# Patient Record
Sex: FEMALE | Race: WHITE | Employment: OTHER | URBAN - METROPOLITAN AREA
[De-identification: names, ages, dates, MRNs, and addresses within clinical notes are randomized per-mention and may not be internally consistent; named-entity substitution may affect disease eponyms.]

---

## 2012-08-02 LAB — COLONOSCOPY, EXTERNAL: NORMAL

## 2017-09-18 ENCOUNTER — HOSPITAL ENCOUNTER (OUTPATIENT)
Dept: MAMMOGRAPHY | Age: 72
Discharge: HOME | End: 2017-09-18
Attending: FAMILY MEDICINE
Payer: MEDICARE

## 2017-09-18 DIAGNOSIS — Z13.820 SCREENING FOR OSTEOPOROSIS: ICD-10-CM

## 2017-09-18 DIAGNOSIS — M81.0 PERIMENOPAUSAL BONE LOSS: ICD-10-CM

## 2017-09-18 PROCEDURE — 77080 DXA BONE DENSITY AXIAL: CPT

## 2020-09-10 VITALS
SYSTOLIC BLOOD PRESSURE: 120 MMHG | OXYGEN SATURATION: 98 % | BODY MASS INDEX: 26.5 KG/M2 | TEMPERATURE: 98 F | HEART RATE: 82 BPM | DIASTOLIC BLOOD PRESSURE: 80 MMHG | HEIGHT: 62 IN | WEIGHT: 144 LBS

## 2020-09-10 PROBLEM — M81.0 OSTEOPOROSIS: Status: ACTIVE | Noted: 2020-09-10

## 2020-09-10 PROBLEM — E78.2 MIXED HYPERLIPIDEMIA: Status: ACTIVE | Noted: 2020-09-10

## 2020-09-10 PROBLEM — K21.9 GERD (GASTROESOPHAGEAL REFLUX DISEASE): Status: ACTIVE | Noted: 2020-09-10

## 2020-09-10 PROBLEM — L65.8 FEMALE PATTERN ALOPECIA: Status: ACTIVE | Noted: 2020-09-10

## 2020-09-10 PROBLEM — I48.91 ATRIAL FIBRILLATION (HCC): Status: ACTIVE | Noted: 2020-09-10

## 2020-09-10 PROBLEM — E66.3 OVERWEIGHT: Status: ACTIVE | Noted: 2020-09-10

## 2020-09-10 PROBLEM — I49.9 CARDIAC ARRHYTHMIA: Status: ACTIVE | Noted: 2020-09-10

## 2020-09-10 PROBLEM — N18.30 KIDNEY DISEASE, CHRONIC, STAGE III (GFR 30-59 ML/MIN) (HCC): Status: ACTIVE | Noted: 2020-09-10

## 2020-09-10 PROBLEM — M19.90 OSTEOARTHRITIS: Status: ACTIVE | Noted: 2020-09-10

## 2020-09-10 PROBLEM — R01.1 HEART MURMUR: Status: ACTIVE | Noted: 2020-09-10

## 2020-09-10 PROBLEM — Z85.3 HISTORY OF MALIGNANT NEOPLASM OF BREAST: Status: ACTIVE | Noted: 2020-09-10

## 2020-09-10 RX ORDER — ATORVASTATIN CALCIUM 10 MG/1
TABLET, FILM COATED ORAL
COMMUNITY
Start: 2017-05-13

## 2020-09-10 RX ORDER — PANTOPRAZOLE SODIUM 40 MG/1
TABLET, DELAYED RELEASE ORAL
COMMUNITY
Start: 2017-05-22 | End: 2021-07-26 | Stop reason: ALTCHOICE

## 2020-09-10 RX ORDER — TRAZODONE HYDROCHLORIDE 50 MG/1
TABLET ORAL
COMMUNITY
Start: 2020-08-13 | End: 2020-11-15

## 2020-09-10 RX ORDER — SPIRONOLACTONE 50 MG/1
TABLET, FILM COATED ORAL
COMMUNITY
Start: 2017-05-31

## 2020-09-10 RX ORDER — METOPROLOL SUCCINATE 25 MG/1
25 TABLET, EXTENDED RELEASE ORAL
COMMUNITY
Start: 2020-07-08

## 2020-09-10 RX ORDER — APIXABAN 5 MG/1
TABLET, FILM COATED ORAL
COMMUNITY
Start: 2020-07-07

## 2020-09-10 RX ORDER — CEPHALEXIN 250 MG/1
CAPSULE ORAL
COMMUNITY
Start: 2020-06-14 | End: 2020-12-29 | Stop reason: SDUPTHER

## 2020-09-14 ENCOUNTER — OFFICE VISIT (OUTPATIENT)
Dept: FAMILY MEDICINE CLINIC | Age: 75
End: 2020-09-14
Payer: MEDICARE

## 2020-09-14 VITALS
WEIGHT: 143.25 LBS | HEIGHT: 63 IN | TEMPERATURE: 97.5 F | BODY MASS INDEX: 25.38 KG/M2 | HEART RATE: 70 BPM | SYSTOLIC BLOOD PRESSURE: 100 MMHG | DIASTOLIC BLOOD PRESSURE: 68 MMHG

## 2020-09-14 DIAGNOSIS — E78.2 MIXED HYPERLIPIDEMIA: ICD-10-CM

## 2020-09-14 DIAGNOSIS — N18.30 KIDNEY DISEASE, CHRONIC, STAGE III (GFR 30-59 ML/MIN) (HCC): Primary | ICD-10-CM

## 2020-09-14 DIAGNOSIS — E66.3 OVERWEIGHT: ICD-10-CM

## 2020-09-14 PROCEDURE — G8419 CALC BMI OUT NRM PARAM NOF/U: HCPCS | Performed by: NURSE PRACTITIONER

## 2020-09-14 PROCEDURE — G8427 DOCREV CUR MEDS BY ELIG CLIN: HCPCS | Performed by: NURSE PRACTITIONER

## 2020-09-14 PROCEDURE — 1101F PT FALLS ASSESS-DOCD LE1/YR: CPT | Performed by: NURSE PRACTITIONER

## 2020-09-14 PROCEDURE — 3017F COLORECTAL CA SCREEN DOC REV: CPT | Performed by: NURSE PRACTITIONER

## 2020-09-14 PROCEDURE — G8432 DEP SCR NOT DOC, RNG: HCPCS | Performed by: NURSE PRACTITIONER

## 2020-09-14 PROCEDURE — G0439 PPPS, SUBSEQ VISIT: HCPCS | Performed by: NURSE PRACTITIONER

## 2020-09-14 PROCEDURE — G8536 NO DOC ELDER MAL SCRN: HCPCS | Performed by: NURSE PRACTITIONER

## 2020-09-14 RX ORDER — ERGOCALCIFEROL 1.25 MG/1
50000 CAPSULE ORAL
Qty: 12 CAP | Refills: 1 | Status: SHIPPED | OUTPATIENT
Start: 2020-09-14 | End: 2021-07-26 | Stop reason: ALTCHOICE

## 2020-09-14 NOTE — PROGRESS NOTES
Medicare Wellness Exam:    Chief Complaint   Patient presents with    Annual Wellness Visit     she is a 76y.o. year old female who presents for evaluation for their Medicare Wellness Visit. She has aged out of Pap screenings and had a mammogram in 2019 that was WNL. She has a history of breast malignancy. She has had both of the shingrix injections and here last tdap was 2017. She had a bone density about 5 years ago and does have a diagnois of osteoporosis and a colonoscopy about 6 or 7 years ago that was WNL. She is followed by Dermatolgy (Dr. Carlene Anderson, urology, nephrology and cardiology. She has chronic UTI and takes antibiotics prophylactically. She got her flu shot at the drug store recently. She is adherent with the medications that treat her conditiions and she is adherent with her medications. She spends part of the year in [place] where her children live and will eventually move down there. She also has a cardiologist in [place]. She is leaving this week as a matter of fact and will be getting her mammogram there. She does not take anything for her osteoporosis and we discussed vitamin D supplementation which she has agreed to. Her appetite is good but she has an irregular sleep schedule since she no longer works and takes trazadone to aid in her sleep. Patient is otherwise without complaints    Fall Screen is completed and assessed=yes  Depression Screen is completed and assessed=yes  Medication list reviewed and adjusted for accuracy=yes  Immunizations reviewed and updated=yes  Health/Preventative Screenings reviewed and updated=yes  ADL Functions reviewed=yes  See scanned medicare wellness documents for full details.      Patient Active Problem List    Diagnosis    Atrial fibrillation [facility])    Cardiac arrhythmia    Female pattern alopecia    GERD (gastroesophageal reflux disease)    Heart murmur    History of malignant neoplasm of breast    Kidney disease, chronic, stage III (GFR 30-59 ml/min) ([facility])    Mixed hyperlipidemia    Osteoarthritis    Osteoporosis    Overweight       Reviewed PmHx, RxHx, FmHx, SocHx, AllgHx and updated and dated in the chart. ROS   ROS per active problem list and HPI    Objective:     Vitals:    09/14/20 0754   BP: 100/68   Pulse: 70   Temp: 97.5 °F (36.4 °C)   TempSrc: Temporal   Weight: 143 lb 4 oz (65 kg)   Height: 5' 3\" (1.6 m)     Physical Exam  Vitals signs reviewed. HENT:      Head: Normocephalic. Neck:      Musculoskeletal: Normal range of motion. Cardiovascular:      Rate and Rhythm: Normal rate and regular rhythm. Heart sounds: Normal heart sounds. Pulmonary:      Effort: Pulmonary effort is normal.      Breath sounds: Normal breath sounds. Abdominal:      General: Bowel sounds are normal.      Palpations: Abdomen is soft. Musculoskeletal: Normal range of motion. Skin:     General: Skin is warm and dry. Neurological:      Mental Status: She is alert and oriented to person, place, and time. Psychiatric:         Mood and Affect: Mood normal.         Behavior: Behavior normal.         Thought Content: Thought content normal.         Judgment: Judgment normal.          Assessment/ Plan:   Diagnoses and all orders for this visit:    1. Kidney disease, chronic, stage III (GFR 30-59 ml/min) ([facility])  -     METABOLIC PANEL, COMPREHENSIVE    2. Mixed hyperlipidemia  -     CBC WITH AUTOMATED DIFF  -     LIPID PANEL    3. Overweight    Other orders  -     ergocalciferol (ERGOCALCIFEROL) 1,250 mcg (50,000 unit) capsule;  Take 1 Cap by mouth every seven (7) days.         -Pain evaluation performed in office  -Cognitive Screen performed in office  -Depression Screen, Fall risks (by up and go test)  and ADL functionality were addressed  -Medication list updated and reviewed for any changes   -A comprehensive review of medical issues and a plan was formulated  -End of life planning was addressed with pt   -Health Screenings for preventions were addressed and a plan was formulated  -Shingles Vaccine was recommended  -Discussed with patient cancer risk factors and appropriate screenings for age  -Patient evaluated for colonoscopy and referred if needed per screeing criteria  -Labs from previous visits were discussed with patient   -Discussed with patient diet and exercise and formulated a plan as needed  -An Advanced care plan was developed with the patient.  -Alcohol screening performed and was negative    -  Follow-up and Dispositions    · Return in about 6 months (around 3/14/2021) for [address] with annual physical and fasting labs. I have discussed the diagnosis with the patient and the intended plan as seen in the above orders. The patient understands and agrees with the plan. The patient has received an after-visit summary and questions were answered concerning future plans. Medication Side Effects and Warnings were discussed with patien  Patient Labs were reviewed and or requested  Patient Past Records were reviewed and or requested    There are no Patient Instructions on file for this visit.       Princess Lopez NP

## 2020-09-15 LAB
ALBUMIN SERPL-MCNC: 4.6 G/DL (ref 3.7–4.7)
ALBUMIN/GLOB SERPL: 2.4 {RATIO} (ref 1.2–2.2)
ALP SERPL-CCNC: 88 IU/L (ref 39–117)
ALT SERPL-CCNC: 12 IU/L (ref 0–32)
AST SERPL-CCNC: 21 IU/L (ref 0–40)
BASOPHILS # BLD AUTO: 0.1 X10E3/UL (ref 0–0.2)
BASOPHILS NFR BLD AUTO: 1 %
BILIRUB SERPL-MCNC: 0.6 MG/DL (ref 0–1.2)
BUN SERPL-MCNC: 19 MG/DL (ref 8–27)
BUN/CREAT SERPL: 18 (ref 12–28)
CALCIUM SERPL-MCNC: 9.3 MG/DL (ref 8.7–10.3)
CHLORIDE SERPL-SCNC: 106 MMOL/L (ref 96–106)
CHOLEST SERPL-MCNC: 172 MG/DL (ref 100–199)
CO2 SERPL-SCNC: 24 MMOL/L (ref 20–29)
CREAT SERPL-MCNC: 1.03 MG/DL (ref 0.57–1)
EOSINOPHIL # BLD AUTO: 0.3 X10E3/UL (ref 0–0.4)
EOSINOPHIL NFR BLD AUTO: 5 %
ERYTHROCYTE [DISTWIDTH] IN BLOOD BY AUTOMATED COUNT: 12.4 % (ref 11.7–15.4)
GLOBULIN SER CALC-MCNC: 1.9 G/DL (ref 1.5–4.5)
GLUCOSE SERPL-MCNC: 75 MG/DL (ref 65–99)
HCT VFR BLD AUTO: 41.3 % (ref 34–46.6)
HDLC SERPL-MCNC: 77 MG/DL
HGB BLD-MCNC: 13.7 G/DL (ref 11.1–15.9)
IMM GRANULOCYTES # BLD AUTO: 0 X10E3/UL (ref 0–0.1)
IMM GRANULOCYTES NFR BLD AUTO: 0 %
LDLC SERPL CALC-MCNC: 82 MG/DL (ref 0–99)
LYMPHOCYTES # BLD AUTO: 1.8 X10E3/UL (ref 0.7–3.1)
LYMPHOCYTES NFR BLD AUTO: 31 %
MCH RBC QN AUTO: 30.8 PG (ref 26.6–33)
MCHC RBC AUTO-ENTMCNC: 33.2 G/DL (ref 31.5–35.7)
MCV RBC AUTO: 93 FL (ref 79–97)
MONOCYTES # BLD AUTO: 0.6 X10E3/UL (ref 0.1–0.9)
MONOCYTES NFR BLD AUTO: 11 %
NEUTROPHILS # BLD AUTO: 3.1 X10E3/UL (ref 1.4–7)
NEUTROPHILS NFR BLD AUTO: 52 %
PLATELET # BLD AUTO: 242 X10E3/UL (ref 150–450)
POTASSIUM SERPL-SCNC: 4.6 MMOL/L (ref 3.5–5.2)
PROT SERPL-MCNC: 6.5 G/DL (ref 6–8.5)
RBC # BLD AUTO: 4.45 X10E6/UL (ref 3.77–5.28)
SODIUM SERPL-SCNC: 143 MMOL/L (ref 134–144)
TRIGL SERPL-MCNC: 67 MG/DL (ref 0–149)
VLDLC SERPL CALC-MCNC: 13 MG/DL (ref 5–40)
WBC # BLD AUTO: 5.9 X10E3/UL (ref 3.4–10.8)

## 2020-10-05 NOTE — PROGRESS NOTES
There is an abnormality in one of your kidney functions but that was there last year and it is actually improved. Your other labs are basically normal.  Some values may be minimally outside the \"normal\" range but are not harmful or clinically significant. Please contact the office if you have questions or concerns. I would advise you to continue avoiding the use of medications such as aleve, or ibuprofen as they have a tendency to damage kidney function with regular use.   We will continue to monitor for continued improvement

## 2020-10-06 ENCOUNTER — TELEPHONE (OUTPATIENT)
Dept: FAMILY MEDICINE CLINIC | Age: 75
End: 2020-10-06

## 2020-10-06 DIAGNOSIS — Z12.31 VISIT FOR SCREENING MAMMOGRAM: Primary | ICD-10-CM

## 2020-10-06 NOTE — TELEPHONE ENCOUNTER
Patient is staying with daughter in [place] until the first of the year and would like an order for her routine mammogram sent to where her daughters goes in [place] .         [address] [facility] [address]  [address] [place] [address] [facility]   #[phone] Fax # [phone]

## 2020-10-11 ENCOUNTER — PATIENT MESSAGE (OUTPATIENT)
Dept: FAMILY MEDICINE CLINIC | Age: 75
End: 2020-10-11

## 2020-10-11 NOTE — TELEPHONE ENCOUNTER
From: Melania Higgins  To: Farhan Esposito NP  Sent: 10/11/2020 6:30 AM EDT  Subject: Non-Urgent Medical Question    After my annual visit on 9-14, I began taking calcium as you prescribed (don't have the prescription info available right now) once a week. On 9-24, I flew to [address] [place] [address] to spend 6 months (as usual) with my children. Within a week, I began struggling with what was initially a nagging throbbing pain behind my right knee. My [address] [place] [address] cardiologist recommended an ultrasound which was normal. Sometimes I have a problem adjusting to the lack of humidity here, and, in the past, I have developed UTIs which I feel are a result of dehydration. I have been drinking huge amounts of water thinking that could be contributing to my discomfort, now I'm also drinking Gatorade. My leg problem continues - it's not leg cramps which I have suffered when dehydrated before - the muscles in my calves, thighs and hips are just constantly sore - can't seem to get relief from walking, heat or Tylenol. I have been diagnosed with kidney failure, and I could schedule an appointment with my nephrologist - but I'm   wondering if the calcium (only new drug) could be the culprit. Please send your recommendations.

## 2020-10-20 ENCOUNTER — PATIENT MESSAGE (OUTPATIENT)
Dept: FAMILY MEDICINE CLINIC | Age: 75
End: 2020-10-20

## 2020-10-20 DIAGNOSIS — Z12.31 SCREENING MAMMOGRAM FOR HIGH-RISK PATIENT: Primary | ICD-10-CM

## 2020-10-20 NOTE — TELEPHONE ENCOUNTER
From: Morenita Pro  To: Eros Wadsworth NP  Sent: 10/20/2020 3:56 PM EDT  Subject: Referral Request    In response to your question about mammograms, I think I generally have digital mammograms on both breasts - breast cancer in right breast 25 years ago.

## 2020-10-22 NOTE — TELEPHONE ENCOUNTER
Sending bilateral mammogram order to [address] [place] [address],  [address].   [facility] [address], [place]., [address] [phone]

## 2020-12-22 ENCOUNTER — TELEPHONE (OUTPATIENT)
Dept: UROLOGY | Age: 75
End: 2020-12-22

## 2020-12-28 NOTE — TELEPHONE ENCOUNTER
Patient last seen on my review on 12/4/19. She should have a visit (was due at 6 mo cooper). Keflex was prescribed for 90 days at that time.

## 2020-12-28 NOTE — TELEPHONE ENCOUNTER
Pt scheduled an appointment on may 5th 2021 she currently is in [facility]. ... so wants to know if you can refill up to that appointment date to the [facility] [address] [facility]

## 2020-12-29 DIAGNOSIS — N39.0 URINARY TRACT INFECTION WITHOUT HEMATURIA, SITE UNSPECIFIED: Primary | ICD-10-CM

## 2020-12-29 DIAGNOSIS — N39.0 URINARY TRACT INFECTION WITHOUT HEMATURIA, SITE UNSPECIFIED: ICD-10-CM

## 2020-12-29 RX ORDER — CEPHALEXIN 250 MG/1
250 CAPSULE ORAL DAILY
Qty: 30 CAP | Refills: 0 | Status: SHIPPED | OUTPATIENT
Start: 2020-12-29 | End: 2020-12-29

## 2020-12-29 RX ORDER — CEPHALEXIN 250 MG/1
250 CAPSULE ORAL DAILY
Qty: 30 CAP | Refills: 0 | Status: SHIPPED | OUTPATIENT
Start: 2020-12-29 | End: 2021-01-28

## 2021-02-20 ENCOUNTER — PATIENT MESSAGE (OUTPATIENT)
Dept: FAMILY MEDICINE CLINIC | Age: 76
End: 2021-02-20

## 2021-02-25 NOTE — TELEPHONE ENCOUNTER
From: Marc Matute  To: Gt Deluca NP  Sent: 2/20/2021 3:53 PM EST  Subject: Test Results Question    I had a mammogram at [address] [facility] in [address] [place] [address] in January, & they have indicated that those results either have been or will be sent to you for comparison with previous results. They have also indicated that I need follow up testing. When you can compare recent & previous screenings, would you contact me as well as Srinivasa re: any necessary follow up. Their # [phone]. Thank you.

## 2021-03-11 NOTE — PROGRESS NOTES
They are asking for prior study and there are none in [place]. I guess we will have to request her paper chart.   Would you call megan and let them know

## 2021-03-16 NOTE — PROGRESS NOTES
Do you want me to send last report that is [facility]?   Last mammo in [facility] done 12/5/19

## 2021-05-03 ENCOUNTER — TELEPHONE (OUTPATIENT)
Dept: UROLOGY | Age: 76
End: 2021-05-03

## 2021-05-03 NOTE — TELEPHONE ENCOUNTER
Patient canceled appointment because she is moving out of town and found a urologist in the [facility] town that she is moving to.  Just wanted to let you know

## 2021-07-26 ENCOUNTER — OFFICE VISIT (OUTPATIENT)
Dept: FAMILY MEDICINE CLINIC | Age: 76
End: 2021-07-26
Payer: MEDICARE

## 2021-07-26 VITALS
DIASTOLIC BLOOD PRESSURE: 76 MMHG | TEMPERATURE: 97.5 F | SYSTOLIC BLOOD PRESSURE: 120 MMHG | OXYGEN SATURATION: 97 % | WEIGHT: 153 LBS | BODY MASS INDEX: 27.11 KG/M2 | HEIGHT: 63 IN | HEART RATE: 76 BPM

## 2021-07-26 DIAGNOSIS — N39.0 URINARY TRACT INFECTION WITH HEMATURIA, SITE UNSPECIFIED: Primary | ICD-10-CM

## 2021-07-26 DIAGNOSIS — R30.0 DYSURIA: ICD-10-CM

## 2021-07-26 DIAGNOSIS — R31.9 URINARY TRACT INFECTION WITH HEMATURIA, SITE UNSPECIFIED: Primary | ICD-10-CM

## 2021-07-26 LAB
BILIRUB UR QL STRIP: NEGATIVE
GLUCOSE UR-MCNC: NEGATIVE MG/DL
KETONES P FAST UR STRIP-MCNC: NEGATIVE MG/DL
PH UR STRIP: 6 [PH] (ref 4.6–8)
PROT UR QL STRIP: NEGATIVE
SP GR UR STRIP: 1.01 (ref 1–1.03)
UA UROBILINOGEN AMB POC: NORMAL (ref 0.2–1)
URINALYSIS CLARITY POC: CLEAR
URINALYSIS COLOR POC: YELLOW
URINE BLOOD POC: NORMAL
URINE LEUKOCYTES POC: NORMAL
URINE NITRITES POC: NEGATIVE

## 2021-07-26 PROCEDURE — 1090F PRES/ABSN URINE INCON ASSESS: CPT | Performed by: STUDENT IN AN ORGANIZED HEALTH CARE EDUCATION/TRAINING PROGRAM

## 2021-07-26 PROCEDURE — G8419 CALC BMI OUT NRM PARAM NOF/U: HCPCS | Performed by: STUDENT IN AN ORGANIZED HEALTH CARE EDUCATION/TRAINING PROGRAM

## 2021-07-26 PROCEDURE — 99213 OFFICE O/P EST LOW 20 MIN: CPT | Performed by: STUDENT IN AN ORGANIZED HEALTH CARE EDUCATION/TRAINING PROGRAM

## 2021-07-26 PROCEDURE — G8432 DEP SCR NOT DOC, RNG: HCPCS | Performed by: STUDENT IN AN ORGANIZED HEALTH CARE EDUCATION/TRAINING PROGRAM

## 2021-07-26 PROCEDURE — G8427 DOCREV CUR MEDS BY ELIG CLIN: HCPCS | Performed by: STUDENT IN AN ORGANIZED HEALTH CARE EDUCATION/TRAINING PROGRAM

## 2021-07-26 PROCEDURE — 1101F PT FALLS ASSESS-DOCD LE1/YR: CPT | Performed by: STUDENT IN AN ORGANIZED HEALTH CARE EDUCATION/TRAINING PROGRAM

## 2021-07-26 PROCEDURE — 81003 URINALYSIS AUTO W/O SCOPE: CPT | Performed by: STUDENT IN AN ORGANIZED HEALTH CARE EDUCATION/TRAINING PROGRAM

## 2021-07-26 PROCEDURE — G8536 NO DOC ELDER MAL SCRN: HCPCS | Performed by: STUDENT IN AN ORGANIZED HEALTH CARE EDUCATION/TRAINING PROGRAM

## 2021-07-26 RX ORDER — FUROSEMIDE 20 MG/1
20 TABLET ORAL DAILY
COMMUNITY
Start: 2021-04-01

## 2021-07-26 RX ORDER — CETIRIZINE HCL 10 MG
10 TABLET ORAL
COMMUNITY

## 2021-07-26 RX ORDER — ACETAMINOPHEN 325 MG/1
650 TABLET ORAL
COMMUNITY

## 2021-07-26 RX ORDER — MINERAL OIL
180 ENEMA (ML) RECTAL EVERY MORNING
COMMUNITY

## 2021-07-26 RX ORDER — AMOXICILLIN 500 MG/1
TABLET, FILM COATED ORAL
COMMUNITY
Start: 2021-07-08 | End: 2021-09-13 | Stop reason: ALTCHOICE

## 2021-07-26 RX ORDER — CEFDINIR 300 MG/1
300 CAPSULE ORAL 2 TIMES DAILY
Qty: 10 CAPSULE | Refills: 0 | Status: SHIPPED | OUTPATIENT
Start: 2021-07-26 | End: 2021-07-31

## 2021-07-26 RX ORDER — D-MANNOSE
500 POWDER (GRAM) ORAL DAILY
COMMUNITY

## 2021-07-26 NOTE — PROGRESS NOTES
Subjective:     Chief Complaint   Patient presents with    UTI     HPI:  Reina Pena is a 68 y.o. female presents for dysuria. Symptoms started today. Patient has history of multiple UTIs. Todays symptoms feels similar to previous UTIs. She complains of dysuria and pressure. She was previously on low-dose daily Keflex and did not have a UTI for about 5 years. Prior to that she was on daily Cipro. Recently she was advised to stop Keflex and start mannose. She was following with Dr. Chanel Nava in the area, but is moving to [place] and is trying to find a new urologist in that area. Denies any other symptoms.     Past Medical History:   Diagnosis Date    Atrial fibrillation ([facility] 9/10/2020    CAD (coronary artery disease) 08/2018    Mitral valve repair    Cancer [facility]) Breast    2005    Cardiac arrhythmia 9/10/2020    Chronic kidney disease Current    Congestive heart failure (HCC) Current    Female pattern alopecia 9/10/2020    GERD (gastroesophageal reflux disease) 9/10/2020    Heart murmur 9/10/2020    History of malignant neoplasm of breast 9/10/2020    Kidney disease, chronic, stage III (GFR 30-59 ml/min) ([facility] [address] [facility] 9/10/2020    Mixed hyperlipidemia 9/10/2020    Osteoarthritis 9/10/2020    Osteoporosis 9/10/2020    Overweight 9/10/2020     Family History   Problem Relation Age of Onset    Hypertension Mother     Stroke Father     Prostate Cancer Father     Cancer Father         Skin Cancer      Social History     Socioeconomic History    Marital status:      Spouse name: Not on file    Number of children: Not on file    Years of education: Not on file    Highest education level: Not on file   Occupational History    Not on file   Tobacco Use    Smoking status: Former Smoker     Packs/day: 1.00     Years: 12.00     Pack years: 12.00    Smokeless tobacco: Never Used   Substance and Sexual Activity    Alcohol use: Not Currently     Comment: Glass of Wine QOD    Drug use: Never    Sexual activity: Not Currently     Partners: Male     Birth control/protection: Pill   Other Topics Concern    Not on file   Social History Narrative    Not on file     Social Determinants of Health     Financial Resource Strain:     Difficulty of Paying Living Expenses:    Food Insecurity:     Worried About Running Out of Food in the Last Year:     [address] in the Last Year:    Transportation Needs:     Lack of Transportation (Medical):  Lack of Transportation (Non-Medical):    Physical Activity:     Days of Exercise per Week:     Minutes of Exercise per Session:    Stress:     Feeling of Stress :    Social Connections:     Frequency of Communication with Friends and Family:     Frequency of Social Gatherings with Friends and Family:     Attends [employer] Services:     Active Member of Clubs or Organizations:     Attends Club or Organization Meetings:     Marital Status:    Intimate Partner Violence:     Fear of Current or Ex-Partner:     Emotionally Abused:     Physically Abused:     Sexually Abused:      Current Outpatient Medications on File Prior to Visit   Medication Sig Dispense Refill    furosemide (LASIX) 20 mg tablet Take 20 mg by mouth daily.  fexofenadine (ALLEGRA) 180 mg tablet Take 180 mg by mouth Every morning.  cetirizine (ZYRTEC) 10 mg tablet Take 10 mg by mouth.  amoxicillin 500 mg tab TAKE FOUR TS PO 1 HOUR B DAPP      d-mannose powd Take 500 mg by mouth daily.  multivitamin (MULTI-DELYN, WELLESSE) liqd Take 15 mL by mouth daily.  acetaminophen (TYLENOL) 325 mg tablet Take 650 mg by mouth every four (4) hours as needed.       traZODone (DESYREL) 50 mg tablet TAKE 1/2 TO 1 TABLET BY MOUTH EVERY NIGHT AT BEDTIME 1 TO 2 HOURS BEFORE DESIRED SLEEP 90 Tab 3    Eliquis 5 mg tablet TK 1 T PO BID      atorvastatin (LIPITOR) 10 mg tablet TK 1 T PO QD HS      metoprolol succinate (TOPROL-XL) 25 mg XL tablet 25 mg.      spironolactone (ALDACTONE) 50 mg tablet TK 1 T PO D      [DISCONTINUED] ergocalciferol (ERGOCALCIFEROL) 1,250 mcg (50,000 unit) capsule Take 1 Cap by mouth every seven (7) days. 12 Cap 1    [DISCONTINUED] pantoprazole (PROTONIX) 40 mg tablet TK 1 T PO D PRF REFLUX       No current facility-administered medications on file prior to visit. Allergies   Allergen Reactions    Latex Hives    Pollen Extracts Other (comments)     Rhinitis     Review of Systems   Genitourinary: Positive for dysuria. Negative for urgency. Objective:     Vitals:    07/26/21 1338   BP: 120/76   Pulse: 76   Temp: 97.5 °F (36.4 °C)   TempSrc: Temporal   SpO2: 97%   Weight: 153 lb (69.4 kg)   Height: 5' 3\" (1.6 m)     Physical Exam  Vitals reviewed. Constitutional:       Appearance: Normal appearance. HENT:      Head: Normocephalic and atraumatic. Cardiovascular:      Rate and Rhythm: Normal rate. Pulmonary:      Effort: Pulmonary effort is normal.   Neurological:      Mental Status: She is alert and oriented to person, place, and time. Psychiatric:         Behavior: Behavior normal.            Assessment/Plan:       1. Urinary tract infection with hematuria, site unspecified        -      Patient with typical UTI symptoms today, and positive UA. She has history of multiple UTIs, and was previously on daily low-dose prophylactic Keflex. Omnicef ordered. Will follow urine culture. We discussed restarting daily low-dose prophylactic Keflex after urine cultures are received. -     cefdinir (OMNICEF) 300 mg capsule; Take 1 Capsule by mouth two (2) times a day for 5 days.  -     URINALYSIS W/ RFLX MICROSCOPIC  -     CULTURE, URINE    2. Dysuria  -     AMB POC URINALYSIS DIP STICK AUTO W/O MICRO    -We will follow-up urine culture. May need to start back on low-dose daily Keflex. Follow-up and Dispositions    · Return if symptoms worsen or fail to improve.             Rylie Marie MD

## 2021-07-26 NOTE — PROGRESS NOTES
Chief Complaint   Patient presents with    UTI     1. Have you been to the ER, urgent care clinic since your last visit? Hospitalized since your last visit? No    2. Have you seen or consulted any other health care providers outside of the [address] [place] [address] since your last visit? Include any pap smears or colon screening.  No

## 2021-07-29 LAB
APPEARANCE UR: CLEAR
BACTERIA #/AREA URNS HPF: NORMAL /[HPF]
BACTERIA UR CULT: ABNORMAL
BILIRUB UR QL STRIP: NEGATIVE
CASTS URNS QL MICRO: NORMAL /LPF
COLOR UR: YELLOW
EPI CELLS #/AREA URNS HPF: NORMAL /HPF (ref 0–10)
GLUCOSE UR QL: NEGATIVE
HGB UR QL STRIP: NEGATIVE
KETONES UR QL STRIP: NEGATIVE
LEUKOCYTE ESTERASE UR QL STRIP: ABNORMAL
MICRO URNS: ABNORMAL
NITRITE UR QL STRIP: NEGATIVE
PH UR STRIP: 6.5 [PH] (ref 5–7.5)
PROT UR QL STRIP: NEGATIVE
RBC #/AREA URNS HPF: NORMAL /HPF (ref 0–2)
SP GR UR: 1.01 (ref 1–1.03)
UROBILINOGEN UR STRIP-MCNC: 0.2 MG/DL (ref 0.2–1)
WBC #/AREA URNS HPF: NORMAL /HPF (ref 0–5)

## 2021-08-06 ENCOUNTER — TELEPHONE (OUTPATIENT)
Dept: FAMILY MEDICINE CLINIC | Age: 76
End: 2021-08-06

## 2021-08-06 NOTE — TELEPHONE ENCOUNTER
Pt thinks her uti never cleared up and would like a refill on her abx she did schedule appointment for Monday 8/9/21just in case.  Please advise

## 2021-08-07 DIAGNOSIS — R31.9 URINARY TRACT INFECTION WITH HEMATURIA, SITE UNSPECIFIED: Primary | ICD-10-CM

## 2021-08-07 DIAGNOSIS — N39.0 URINARY TRACT INFECTION WITH HEMATURIA, SITE UNSPECIFIED: Primary | ICD-10-CM

## 2021-08-07 RX ORDER — CIPROFLOXACIN 500 MG/1
500 TABLET ORAL 2 TIMES DAILY
Qty: 10 TABLET | Refills: 0 | Status: SHIPPED | OUTPATIENT
Start: 2021-08-07 | End: 2021-08-12

## 2021-08-07 NOTE — TELEPHONE ENCOUNTER
Cipro ordered and she continues have UTI symptoms. Urine culture UTI which is susceptible to Cipro. She has taken Cipro in the past without side effects. Message sent to her via [address]

## 2021-08-09 ENCOUNTER — OFFICE VISIT (OUTPATIENT)
Dept: FAMILY MEDICINE CLINIC | Age: 76
End: 2021-08-09
Payer: MEDICARE

## 2021-08-09 VITALS
WEIGHT: 153 LBS | BODY MASS INDEX: 27.11 KG/M2 | OXYGEN SATURATION: 97 % | HEIGHT: 63 IN | DIASTOLIC BLOOD PRESSURE: 74 MMHG | SYSTOLIC BLOOD PRESSURE: 118 MMHG | HEART RATE: 67 BPM | TEMPERATURE: 97.6 F

## 2021-08-09 DIAGNOSIS — R31.9 URINARY TRACT INFECTION WITH HEMATURIA, SITE UNSPECIFIED: ICD-10-CM

## 2021-08-09 DIAGNOSIS — N39.0 URINARY TRACT INFECTION WITH HEMATURIA, SITE UNSPECIFIED: ICD-10-CM

## 2021-08-09 DIAGNOSIS — N39.0 RECURRENT UTI: Primary | ICD-10-CM

## 2021-08-09 PROCEDURE — G8432 DEP SCR NOT DOC, RNG: HCPCS | Performed by: STUDENT IN AN ORGANIZED HEALTH CARE EDUCATION/TRAINING PROGRAM

## 2021-08-09 PROCEDURE — 1090F PRES/ABSN URINE INCON ASSESS: CPT | Performed by: STUDENT IN AN ORGANIZED HEALTH CARE EDUCATION/TRAINING PROGRAM

## 2021-08-09 PROCEDURE — 99213 OFFICE O/P EST LOW 20 MIN: CPT | Performed by: STUDENT IN AN ORGANIZED HEALTH CARE EDUCATION/TRAINING PROGRAM

## 2021-08-09 PROCEDURE — G8427 DOCREV CUR MEDS BY ELIG CLIN: HCPCS | Performed by: STUDENT IN AN ORGANIZED HEALTH CARE EDUCATION/TRAINING PROGRAM

## 2021-08-09 PROCEDURE — 1101F PT FALLS ASSESS-DOCD LE1/YR: CPT | Performed by: STUDENT IN AN ORGANIZED HEALTH CARE EDUCATION/TRAINING PROGRAM

## 2021-08-09 PROCEDURE — G8419 CALC BMI OUT NRM PARAM NOF/U: HCPCS | Performed by: STUDENT IN AN ORGANIZED HEALTH CARE EDUCATION/TRAINING PROGRAM

## 2021-08-09 PROCEDURE — G8536 NO DOC ELDER MAL SCRN: HCPCS | Performed by: STUDENT IN AN ORGANIZED HEALTH CARE EDUCATION/TRAINING PROGRAM

## 2021-08-09 NOTE — PROGRESS NOTES
Subjective:     Chief Complaint   Patient presents with    Follow-up     UTI     HPI:  Eric Celestin is a 68 y.o. female who is here for follow-up of UTI. She recently had an appointment, and was placed on Omnicef due to UTI. About 2 to 3 days after finishing Omnicef she began having UTI symptoms again. Cipro was ordered over the weekend which she started taking yesterday. Symptoms have resolved for now. She does have history of multiple UTIs and had previously been placed on prophylactic low-dose Keflex and prior to that low-dose prophylactic Cipro. She would like to be restarted on his antibiotics.     Past Medical History:   Diagnosis Date    Atrial fibrillation ([facility] [address] 9/10/2020    CAD (coronary artery disease) 08/2018    Mitral valve repair    Cancer [facility]) Breast    2005    Cardiac arrhythmia 9/10/2020    Chronic kidney disease Current    Congestive heart failure (HCC) Current    Female pattern alopecia 9/10/2020    GERD (gastroesophageal reflux disease) 9/10/2020    Heart murmur 9/10/2020    History of malignant neoplasm of breast 9/10/2020    Kidney disease, chronic, stage III (GFR 30-59 ml/min) ([address] 9/10/2020    Mixed hyperlipidemia 9/10/2020    Osteoarthritis 9/10/2020    Osteoporosis 9/10/2020    Overweight 9/10/2020     Family History   Problem Relation Age of Onset    Hypertension Mother     Stroke Father     Prostate Cancer Father     Cancer Father         Skin Cancer      Social History     Socioeconomic History    Marital status:      Spouse name: Not on file    Number of children: Not on file    Years of education: Not on file    Highest education level: Not on file   Occupational History    Not on file   Tobacco Use    Smoking status: Former Smoker     Packs/day: 1.00     Years: 12.00     Pack years: 12.00    Smokeless tobacco: Never Used   Substance and Sexual Activity    Alcohol use: Not Currently     Comment: Glass of Wine QOD    Drug use: Never    Sexual activity: Not Currently     Partners: Male     Birth control/protection: Pill   Other Topics Concern    Not on file   Social History Narrative    Not on file     Social Determinants of Health     Financial Resource Strain:     Difficulty of Paying Living Expenses:    Food Insecurity:     Worried About Running Out of Food in the Last Year:     [address] in the Last Year:    Transportation Needs:     Lack of Transportation (Medical):  Lack of Transportation (Non-Medical):    Physical Activity:     Days of Exercise per Week:     Minutes of Exercise per Session:    Stress:     Feeling of Stress :    Social Connections:     Frequency of Communication with Friends and Family:     Frequency of Social Gatherings with Friends and Family:     Attends Voodoo Services:     Active Member of Clubs or Organizations:     Attends Club or Organization Meetings:     Marital Status:    Intimate Partner Violence:     Fear of Current or Ex-Partner:     Emotionally Abused:     Physically Abused:     Sexually Abused:      Current Outpatient Medications on File Prior to Visit   Medication Sig Dispense Refill    ciprofloxacin HCl (CIPRO) 500 mg tablet Take 1 Tablet by mouth two (2) times a day for 5 days. 10 Tablet 0    furosemide (LASIX) 20 mg tablet Take 20 mg by mouth daily.  fexofenadine (ALLEGRA) 180 mg tablet Take 180 mg by mouth Every morning.  cetirizine (ZYRTEC) 10 mg tablet Take 10 mg by mouth.  amoxicillin 500 mg tab TAKE FOUR TS PO 1 HOUR B DAPP      d-mannose powd Take 500 mg by mouth daily.  multivitamin (MULTI-DELYN, WELLESSE) liqd Take 15 mL by mouth daily.  acetaminophen (TYLENOL) 325 mg tablet Take 650 mg by mouth every four (4) hours as needed.       traZODone (DESYREL) 50 mg tablet TAKE 1/2 TO 1 TABLET BY MOUTH EVERY NIGHT AT BEDTIME 1 TO 2 HOURS BEFORE DESIRED SLEEP 90 Tab 3    Eliquis 5 mg tablet TK 1 T PO BID      atorvastatin (LIPITOR) 10 mg tablet TK 1 T PO QD HS      metoprolol succinate (TOPROL-XL) 25 mg XL tablet 25 mg.      spironolactone (ALDACTONE) 50 mg tablet TK 1 T PO D       No current facility-administered medications on file prior to visit. Allergies   Allergen Reactions    Latex Hives    Pollen Extracts Other (comments)     Rhinitis     Review of Systems   All other systems reviewed and are negative. Objective:     Vitals:    08/09/21 1610   BP: 118/74   Pulse: 67   Temp: 97.6 °F (36.4 °C)   TempSrc: Temporal   SpO2: 97%   Weight: 153 lb (69.4 kg)   Height: 5' 3\" (1.6 m)     Physical Exam  Vitals reviewed. Constitutional:       Appearance: Normal appearance. HENT:      Head: Normocephalic and atraumatic. Cardiovascular:      Rate and Rhythm: Normal rate. Pulmonary:      Effort: Pulmonary effort is normal.   Neurological:      Mental Status: She is alert and oriented to person, place, and time. Psychiatric:         Behavior: Behavior normal.            Assessment/Plan:       1. Recurrent UTI  -     URINALYSIS W/ RFLX MICROSCOPIC  -     CULTURE, URINE    2. Urinary tract infection with hematuria, site unspecified    Plan: Advised to finish Cipro. We will follow-up urine culture and UA. Will wait for urine culture to come back, but will likely restart her on prophylactic low-dose Keflex. Follow-up and Dispositions    · Return if symptoms worsen or fail to improve.           Kevin Nuñez MD

## 2021-08-09 NOTE — PROGRESS NOTES
Chief Complaint   Patient presents with    Follow-up     UTI     1. Have you been to the ER, urgent care clinic since your last visit? Hospitalized since your last visit? No    2. Have you seen or consulted any other health care providers outside of the [address] [place] [address] since your last visit? Include any pap smears or colon screening.  No

## 2021-08-11 LAB
APPEARANCE UR: CLEAR
BACTERIA UR CULT: NO GROWTH
BILIRUB UR QL STRIP: NEGATIVE
COLOR UR: YELLOW
GLUCOSE UR QL: NEGATIVE
HGB UR QL STRIP: NEGATIVE
KETONES UR QL STRIP: NEGATIVE
LEUKOCYTE ESTERASE UR QL STRIP: NEGATIVE
MICRO URNS: NORMAL
NITRITE UR QL STRIP: NEGATIVE
PH UR STRIP: 6.5 [PH] (ref 5–7.5)
PROT UR QL STRIP: NEGATIVE
SP GR UR: 1.01 (ref 1–1.03)
UROBILINOGEN UR STRIP-MCNC: 0.2 MG/DL (ref 0.2–1)

## 2021-08-13 DIAGNOSIS — N39.0 RECURRENT UTI: Primary | ICD-10-CM

## 2021-08-13 RX ORDER — CEPHALEXIN 250 MG/1
250 CAPSULE ORAL DAILY
Qty: 90 CAPSULE | Refills: 0 | Status: SHIPPED | OUTPATIENT
Start: 2021-08-13 | End: 2022-01-18

## 2021-08-13 NOTE — PROGRESS NOTES
Results sent via [email or web address]:Urine culture and urine analysis is negative. The low-dose daily Keflex will be ordered. If you continue to have symptoms despite being antibiotic being started he will need to see urology. Since we are starting daily low-dose Keflex I would like you to make an appointment with us about 2 weeks after starting antibiotic.

## 2021-09-13 ENCOUNTER — OFFICE VISIT (OUTPATIENT)
Dept: FAMILY MEDICINE CLINIC | Age: 76
End: 2021-09-13
Payer: MEDICARE

## 2021-09-13 VITALS
OXYGEN SATURATION: 98 % | WEIGHT: 155.4 LBS | BODY MASS INDEX: 27.54 KG/M2 | RESPIRATION RATE: 16 BRPM | SYSTOLIC BLOOD PRESSURE: 100 MMHG | HEART RATE: 108 BPM | DIASTOLIC BLOOD PRESSURE: 78 MMHG | HEIGHT: 63 IN | TEMPERATURE: 97.3 F

## 2021-09-13 DIAGNOSIS — I48.91 ATRIAL FIBRILLATION, UNSPECIFIED TYPE (HCC): Primary | ICD-10-CM

## 2021-09-13 DIAGNOSIS — M15.8 OTHER OSTEOARTHRITIS INVOLVING MULTIPLE JOINTS: ICD-10-CM

## 2021-09-13 DIAGNOSIS — Z00.00 MEDICARE ANNUAL WELLNESS VISIT, SUBSEQUENT: ICD-10-CM

## 2021-09-13 DIAGNOSIS — N18.31 STAGE 3A CHRONIC KIDNEY DISEASE (HCC): ICD-10-CM

## 2021-09-13 DIAGNOSIS — E66.3 OVERWEIGHT: ICD-10-CM

## 2021-09-13 DIAGNOSIS — Z23 ENCOUNTER FOR IMMUNIZATION: ICD-10-CM

## 2021-09-13 DIAGNOSIS — R01.1 HEART MURMUR: ICD-10-CM

## 2021-09-13 DIAGNOSIS — Z11.59 ENCOUNTER FOR HEPATITIS C SCREENING TEST FOR LOW RISK PATIENT: ICD-10-CM

## 2021-09-13 DIAGNOSIS — E78.2 MIXED HYPERLIPIDEMIA: ICD-10-CM

## 2021-09-13 DIAGNOSIS — M81.0 OSTEOPOROSIS WITHOUT CURRENT PATHOLOGICAL FRACTURE, UNSPECIFIED OSTEOPOROSIS TYPE: ICD-10-CM

## 2021-09-13 PROCEDURE — G8536 NO DOC ELDER MAL SCRN: HCPCS | Performed by: NURSE PRACTITIONER

## 2021-09-13 PROCEDURE — G8419 CALC BMI OUT NRM PARAM NOF/U: HCPCS | Performed by: NURSE PRACTITIONER

## 2021-09-13 PROCEDURE — G0439 PPPS, SUBSEQ VISIT: HCPCS | Performed by: NURSE PRACTITIONER

## 2021-09-13 PROCEDURE — 1101F PT FALLS ASSESS-DOCD LE1/YR: CPT | Performed by: NURSE PRACTITIONER

## 2021-09-13 PROCEDURE — G0008 ADMIN INFLUENZA VIRUS VAC: HCPCS | Performed by: NURSE PRACTITIONER

## 2021-09-13 PROCEDURE — G8427 DOCREV CUR MEDS BY ELIG CLIN: HCPCS | Performed by: NURSE PRACTITIONER

## 2021-09-13 PROCEDURE — G8432 DEP SCR NOT DOC, RNG: HCPCS | Performed by: NURSE PRACTITIONER

## 2021-09-13 PROCEDURE — 90694 VACC AIIV4 NO PRSRV 0.5ML IM: CPT | Performed by: NURSE PRACTITIONER

## 2021-09-13 RX ORDER — ZOSTER VACCINE RECOMBINANT, ADJUVANTED 50 MCG/0.5
0.5 KIT INTRAMUSCULAR ONCE
Qty: 0.5 ML | Refills: 0 | Status: SHIPPED | OUTPATIENT
Start: 2021-09-13 | End: 2021-09-13

## 2021-09-13 RX ORDER — MULTIVITAMIN
1 TABLET ORAL DAILY
COMMUNITY

## 2021-09-13 NOTE — PROGRESS NOTES
Medicare Wellness Exam:    Chief Complaint   Patient presents with    Annual Wellness Visit     Medicare, subsequent    Labs     she is a 68y.o. year old female who presents for evaluation for their Medicare Wellness Visit. Her health screenings are as documented in the EMR. Patient declines any further colonoscopies. She is followed by cardiology for her a-fib at least 2-3 times a year and is just \"resigned\" to live with A-fib. I am sending the shingles vaccine to the pharmacy for administration. Of note is that patient is moving to [place] about this time next year    Alfjaviera Ck is completed and assessed=yes  Depression Screen is completed and assessed=yes  Medication list reviewed and adjusted for accuracy=yes  Immunizations reviewed and updated=yes  Health/Preventative Screenings reviewed and updated=yes  ADL Functions reviewed=yes  See scanned medicare wellness documents for full details. Patient Active Problem List    Diagnosis    Encounter for hepatitis C screening test for low risk patient    Atrial fibrillation [facility])    Cardiac arrhythmia    Female pattern alopecia    GERD (gastroesophageal reflux disease)    Heart murmur    History of malignant neoplasm of breast    Kidney disease, chronic, stage III (GFR 30-59 ml/min) (HCC)    Mixed hyperlipidemia    Osteoarthritis    Osteoporosis    Overweight       Reviewed PmHx, RxHx, FmHx, SocHx, AllgHx and updated and dated in the chart. ROS   ROS per HPI and PMH    Objective:     Vitals:    09/13/21 0756   BP: 100/78   Pulse: (!) 108   Resp: 16   Temp: 97.3 °F (36.3 °C)   TempSrc: Temporal   SpO2: 98%   Weight: 155 lb 6.4 oz (70.5 kg)   Height: 5' 3\" (1.6 m)     Physical Exam  Vitals reviewed. Cardiovascular:      Heart sounds: Normal heart sounds. Pulmonary:      Effort: Pulmonary effort is normal.      Breath sounds: Normal breath sounds. Abdominal:      General: Bowel sounds are normal.      Palpations: Abdomen is soft. Musculoskeletal:         General: Normal range of motion. Skin:     General: Skin is warm and dry. Neurological:      Mental Status: She is alert and oriented to person, place, and time. Psychiatric:         Mood and Affect: Mood normal.         Behavior: Behavior normal.         Thought Content: Thought content normal.         Judgment: Judgment normal.          Assessment/ Plan:   Diagnoses and all orders for this visit:    1. Atrial fibrillation, unspecified type [facility])  Patient is followed by cardiology at least 2-3 times a year    2. Stage 3a chronic kidney disease ([facility] [address]  Patient is followed by cardiology for this condition    3. Mixed hyperlipidemia  -     LIPID PANEL  Obtaining updated lipid panel and will make treatment decisions when I get the results    4. Overweight  -     CBC WITH AUTOMATED DIFF  -     METABOLIC PANEL, COMPREHENSIVE  Obtaining updated CBC and CMP and will make treatment decisions when I get the results    5. Encounter for hepatitis C screening test for low risk patient  -     HEPATITIS C AB  Screening for hepatitis C and will confirm positive with additional lab testing and will also refer to GI for additional testing    6. Heart murmur  Patient is followed by cardiology for this condition at least 2-3 times a year    7. Medicare annual wellness visit, subsequent  Please refer to nurse intake summary for details    8. Osteoporosis without current pathological fracture, unspecified osteoporosis type  Patient declines once a week biphospenate but will add calcium and vitamin D    9.  Other osteoarthritis involving multiple joints  Patient is followed by ortho for this condidion    Other orders  -     varicella-zoster recombinant, PF, (Shingrix, PF,) 50 mcg/0.5 mL susr injection; 0.5 mL by IntraMUSCular route once for 1 dose.         -Pain evaluation performed in office  -Cognitive Screen performed in office  -Depression Screen, Fall risks (by up and go test)  and ADL functionality were addressed  -Medication list updated and reviewed for any changes   -A comprehensive review of medical issues and a plan was formulated  -End of life planning was addressed with pt   -Health Screenings for preventions were addressed and a plan was formulated  -Shingles Vaccine was recommended  -Discussed with patient cancer risk factors and appropriate screenings for age  -Patient evaluated for colonoscopy and referred if needed per screeing criteria  -Labs from previous visits were discussed with patient   -Discussed with patient diet and exercise and formulated a plan as needed  -An Advanced care plan was developed with the patient.  -Alcohol screening performed and was negative    -  Follow-up and Dispositions    · Return in about 1 year (around 9/13/2022) for [address] with annual physical and fasting labs. I have discussed the diagnosis with the patient and the intended plan as seen in the above orders. The patient understands and agrees with the plan. The patient has received an after-visit summary and questions were answered concerning future plans. Medication Side Effects and Warnings were discussed with patien  Patient Labs were reviewed and or requested  Patient Past Records were reviewed and or requested    There are no Patient Instructions on file for this visit.       Adri Vega NP

## 2021-09-13 NOTE — PROGRESS NOTES
Chief Complaint   Patient presents with   [employer] Annual Wellness Visit     Medicare, subsequent    Labs     1. Have you been to the ER, urgent care clinic since your last visit? Hospitalized since your last visit? No    2. Have you seen or consulted any other health care providers outside of the [address] [place] [address] since your last visit? Include any pap smears or colon screening. In [place], Cardiologist. neprhologist    3 most recent [facility] Screens 9/13/2021   Little interest or pleasure in doing things Not at all   Feeling down, depressed, irritable, or hopeless Not at all   Total Score PHQ 2 0     Fall Risk Assessment, last 12 mths 9/13/2021   Able to walk? Yes   Fall in past 12 months? 0   Do you feel unsteady?  0   Are you worried about falling 0             Visit Vitals  /78 (BP 1 Location: Left upper arm, BP Patient Position: Sitting, BP Cuff Size: Adult)   Pulse (!) 108   Temp 97.3 °F (36.3 °C) (Temporal)   Resp 16   Ht 5' 3\" (1.6 m)   Wt 155 lb 6.4 oz (70.5 kg)   SpO2 98%   BMI 27.53 kg/m²

## 2021-09-14 LAB
ALBUMIN SERPL-MCNC: 4.1 G/DL (ref 3.7–4.7)
ALBUMIN/GLOB SERPL: 1.9 {RATIO} (ref 1.2–2.2)
ALP SERPL-CCNC: 96 IU/L (ref 44–121)
ALT SERPL-CCNC: 11 IU/L (ref 0–32)
AST SERPL-CCNC: 18 IU/L (ref 0–40)
BASOPHILS # BLD AUTO: 0.1 X10E3/UL (ref 0–0.2)
BASOPHILS NFR BLD AUTO: 1 %
BILIRUB SERPL-MCNC: 0.5 MG/DL (ref 0–1.2)
BUN SERPL-MCNC: 14 MG/DL (ref 8–27)
BUN/CREAT SERPL: 14 (ref 12–28)
CALCIUM SERPL-MCNC: 8.9 MG/DL (ref 8.7–10.3)
CHLORIDE SERPL-SCNC: 105 MMOL/L (ref 96–106)
CHOLEST SERPL-MCNC: 172 MG/DL (ref 100–199)
CO2 SERPL-SCNC: 24 MMOL/L (ref 20–29)
CREAT SERPL-MCNC: 1.03 MG/DL (ref 0.57–1)
EOSINOPHIL # BLD AUTO: 0.5 X10E3/UL (ref 0–0.4)
EOSINOPHIL NFR BLD AUTO: 8 %
ERYTHROCYTE [DISTWIDTH] IN BLOOD BY AUTOMATED COUNT: 11.7 % (ref 11.7–15.4)
GLOBULIN SER CALC-MCNC: 2.2 G/DL (ref 1.5–4.5)
GLUCOSE SERPL-MCNC: 87 MG/DL (ref 65–99)
HCT VFR BLD AUTO: 39.2 % (ref 34–46.6)
HCV AB S/CO SERPL IA: <0.1 S/CO RATIO (ref 0–0.9)
HDLC SERPL-MCNC: 79 MG/DL
HGB BLD-MCNC: 13.3 G/DL (ref 11.1–15.9)
IMM GRANULOCYTES # BLD AUTO: 0 X10E3/UL (ref 0–0.1)
IMM GRANULOCYTES NFR BLD AUTO: 0 %
LDLC SERPL CALC-MCNC: 82 MG/DL (ref 0–99)
LYMPHOCYTES # BLD AUTO: 1.5 X10E3/UL (ref 0.7–3.1)
LYMPHOCYTES NFR BLD AUTO: 24 %
MCH RBC QN AUTO: 31.7 PG (ref 26.6–33)
MCHC RBC AUTO-ENTMCNC: 33.9 G/DL (ref 31.5–35.7)
MCV RBC AUTO: 94 FL (ref 79–97)
MONOCYTES # BLD AUTO: 0.7 X10E3/UL (ref 0.1–0.9)
MONOCYTES NFR BLD AUTO: 11 %
NEUTROPHILS # BLD AUTO: 3.5 X10E3/UL (ref 1.4–7)
NEUTROPHILS NFR BLD AUTO: 56 %
PLATELET # BLD AUTO: 257 X10E3/UL (ref 150–450)
POTASSIUM SERPL-SCNC: 4.4 MMOL/L (ref 3.5–5.2)
PROT SERPL-MCNC: 6.3 G/DL (ref 6–8.5)
RBC # BLD AUTO: 4.19 X10E6/UL (ref 3.77–5.28)
SODIUM SERPL-SCNC: 141 MMOL/L (ref 134–144)
TRIGL SERPL-MCNC: 55 MG/DL (ref 0–149)
VLDLC SERPL CALC-MCNC: 11 MG/DL (ref 5–40)
WBC # BLD AUTO: 6.3 X10E3/UL (ref 3.4–10.8)

## 2021-09-15 DIAGNOSIS — R92.8 ABNORMAL MAMMOGRAM: Primary | ICD-10-CM

## 2021-09-15 NOTE — PROGRESS NOTES
Your kidney labs are slightly abnormal but stable as they are the same levels that you had a year ago at your last set of labs so we will continue to monitor them. Continue to avoid the use of motrin, aleve and IbuprofenYour other labs are basically normal.  Some values may be minimally outside the  \"normal\" range but are not harmful or clinically significant. Please contact the office if you have questions or concerns.   We will recheck your labs at your next office visit

## 2021-11-04 RX ORDER — TRAZODONE HYDROCHLORIDE 50 MG/1
TABLET ORAL
Qty: 90 TABLET | Refills: 3 | Status: SHIPPED | OUTPATIENT
Start: 2021-11-04

## 2022-01-04 ENCOUNTER — PATIENT MESSAGE (OUTPATIENT)
Dept: FAMILY MEDICINE CLINIC | Age: 77
End: 2022-01-04

## 2022-01-05 NOTE — TELEPHONE ENCOUNTER
From: Anjali Celis  To: Giovanna aVnce MD  Sent: 1/4/2022 6:51 PM EST  Subject: Paxlovid    I am in [address] [facility], tested positive for Covid 1-1 & every day since. I have had both Pfizer vaccinations & booster. I havent had any fever, symptoms include a deep cough, head congestion, some joint pain & extreme fatigue, also isolating from family, all of whom test negative. I do not have a GP here. Nurse practitioner advised to treat symptoms only - there are no breathing problems. Would I be considered a patient for the Pfizer pull, Paxlovid?

## 2022-01-06 ENCOUNTER — TELEPHONE (OUTPATIENT)
Dept: FAMILY MEDICINE CLINIC | Age: 77
End: 2022-01-06

## 2022-01-06 NOTE — TELEPHONE ENCOUNTER
Paxlovid called in to [address] in [phone]. I called pharmacy to confirm and they were not open yet. Can you call them again and make sure they got it. It is the new antiviral for Covid.   Requested by patient

## 2022-01-18 DIAGNOSIS — N39.0 RECURRENT UTI: ICD-10-CM

## 2022-01-18 RX ORDER — CEPHALEXIN 250 MG/1
250 CAPSULE ORAL DAILY
Qty: 90 CAPSULE | Refills: 0 | Status: SHIPPED | OUTPATIENT
Start: 2022-01-18 | End: 2022-04-29

## 2022-02-09 DIAGNOSIS — R92.8 ABNORMAL MAMMOGRAM: ICD-10-CM

## 2022-03-18 PROBLEM — R01.1 HEART MURMUR: Status: ACTIVE | Noted: 2020-09-10

## 2022-03-18 PROBLEM — Z85.3 HISTORY OF MALIGNANT NEOPLASM OF BREAST: Status: ACTIVE | Noted: 2020-09-10

## 2022-03-19 PROBLEM — M19.90 OSTEOARTHRITIS: Status: ACTIVE | Noted: 2020-09-10

## 2022-03-19 PROBLEM — Z11.59 ENCOUNTER FOR HEPATITIS C SCREENING TEST FOR LOW RISK PATIENT: Status: ACTIVE | Noted: 2021-09-13

## 2022-03-19 PROBLEM — E78.2 MIXED HYPERLIPIDEMIA: Status: ACTIVE | Noted: 2020-09-10

## 2022-03-19 PROBLEM — I48.91 ATRIAL FIBRILLATION (HCC): Status: ACTIVE | Noted: 2020-09-10

## 2022-03-19 PROBLEM — N18.30 KIDNEY DISEASE, CHRONIC, STAGE III (GFR 30-59 ML/MIN) (HCC): Status: ACTIVE | Noted: 2020-09-10

## 2022-03-19 PROBLEM — L65.8 FEMALE PATTERN ALOPECIA: Status: ACTIVE | Noted: 2020-09-10

## 2022-03-19 PROBLEM — K21.9 GERD (GASTROESOPHAGEAL REFLUX DISEASE): Status: ACTIVE | Noted: 2020-09-10

## 2022-03-19 PROBLEM — I49.9 CARDIAC ARRHYTHMIA: Status: ACTIVE | Noted: 2020-09-10

## 2022-03-19 PROBLEM — M81.0 OSTEOPOROSIS: Status: ACTIVE | Noted: 2020-09-10

## 2022-03-19 PROBLEM — E66.3 OVERWEIGHT: Status: ACTIVE | Noted: 2020-09-10

## 2022-04-27 DIAGNOSIS — N39.0 RECURRENT UTI: ICD-10-CM

## 2022-04-29 RX ORDER — CEPHALEXIN 250 MG/1
250 CAPSULE ORAL DAILY
Qty: 90 CAPSULE | Refills: 0 | Status: SHIPPED | OUTPATIENT
Start: 2022-04-29 | End: 2022-07-26

## 2022-07-25 DIAGNOSIS — N39.0 RECURRENT UTI: ICD-10-CM

## 2022-07-26 RX ORDER — CEPHALEXIN 250 MG/1
250 CAPSULE ORAL DAILY
Qty: 90 CAPSULE | Refills: 0 | Status: SHIPPED | OUTPATIENT
Start: 2022-07-26 | End: 2022-10-19

## 2022-10-18 DIAGNOSIS — N39.0 RECURRENT UTI: ICD-10-CM

## 2022-10-19 RX ORDER — CEPHALEXIN 250 MG/1
250 CAPSULE ORAL DAILY
Qty: 90 CAPSULE | Refills: 0 | Status: SHIPPED | OUTPATIENT
Start: 2022-10-19

## 2022-11-20 RX ORDER — TRAZODONE HYDROCHLORIDE 50 MG/1
TABLET ORAL
Qty: 30 TABLET | Refills: 0 | Status: SHIPPED | OUTPATIENT
Start: 2022-11-20

## 2022-12-06 RX ORDER — TRAZODONE HYDROCHLORIDE 50 MG/1
TABLET ORAL
Qty: 30 TABLET | Refills: 0 | Status: SHIPPED | OUTPATIENT
Start: 2022-12-06

## 2023-01-17 DIAGNOSIS — N39.0 RECURRENT UTI: ICD-10-CM

## 2023-01-18 RX ORDER — CEPHALEXIN 250 MG/1
250 CAPSULE ORAL DAILY
Qty: 90 CAPSULE | Refills: 0 | Status: SHIPPED | OUTPATIENT
Start: 2023-01-18

## 2023-05-25 RX ORDER — FEXOFENADINE HCL 180 MG/1
180 TABLET ORAL EVERY MORNING
COMMUNITY

## 2023-05-25 RX ORDER — TRAZODONE HYDROCHLORIDE 50 MG/1
TABLET ORAL
COMMUNITY
Start: 2022-12-06

## 2023-05-25 RX ORDER — FUROSEMIDE 20 MG/1
20 TABLET ORAL DAILY
COMMUNITY
Start: 2021-04-01

## 2023-05-25 RX ORDER — METOPROLOL SUCCINATE 25 MG/1
25 TABLET, EXTENDED RELEASE ORAL
COMMUNITY
Start: 2020-07-08

## 2023-05-25 RX ORDER — CETIRIZINE HYDROCHLORIDE 10 MG/1
10 TABLET ORAL
COMMUNITY

## 2023-05-25 RX ORDER — CEPHALEXIN 250 MG/1
250 CAPSULE ORAL DAILY
COMMUNITY
Start: 2023-01-18

## 2023-05-25 RX ORDER — SPIRONOLACTONE 50 MG/1
1 TABLET, FILM COATED ORAL DAILY
COMMUNITY
Start: 2017-05-31

## 2023-05-25 RX ORDER — D-MANNOSE 99 %
500 POWDER (GRAM) MISCELLANEOUS DAILY
COMMUNITY

## 2023-05-25 RX ORDER — ATORVASTATIN CALCIUM 10 MG/1
TABLET, FILM COATED ORAL
COMMUNITY
Start: 2017-05-13

## 2023-05-25 RX ORDER — ACETAMINOPHEN 325 MG/1
650 TABLET ORAL EVERY 4 HOURS PRN
COMMUNITY

## 2023-05-25 RX ORDER — CALCIUM CARBONATE/VITAMIN D3 600 MG-10
1 TABLET ORAL DAILY
COMMUNITY